# Patient Record
Sex: MALE | Race: WHITE | URBAN - METROPOLITAN AREA
[De-identification: names, ages, dates, MRNs, and addresses within clinical notes are randomized per-mention and may not be internally consistent; named-entity substitution may affect disease eponyms.]

---

## 2018-01-05 ENCOUNTER — HOSPITAL ENCOUNTER (EMERGENCY)
Facility: CLINIC | Age: 73
Discharge: HOME | End: 2018-01-05
Attending: INTERNAL MEDICINE | Admitting: INTERNAL MEDICINE
Payer: MEDICARE

## 2018-01-05 ENCOUNTER — APPOINTMENT (OUTPATIENT)
Dept: CT IMAGING | Facility: CLINIC | Age: 73
End: 2018-01-05
Attending: INTERNAL MEDICINE
Payer: MEDICARE

## 2018-01-05 ENCOUNTER — APPOINTMENT (OUTPATIENT)
Dept: GENERAL RADIOLOGY | Facility: CLINIC | Age: 73
End: 2018-01-05
Attending: INTERNAL MEDICINE
Payer: MEDICARE

## 2018-01-05 VITALS
HEART RATE: 64 BPM | RESPIRATION RATE: 16 BRPM | TEMPERATURE: 98.3 F | WEIGHT: 283.44 LBS | SYSTOLIC BLOOD PRESSURE: 147 MMHG | DIASTOLIC BLOOD PRESSURE: 66 MMHG | OXYGEN SATURATION: 96 %

## 2018-01-05 DIAGNOSIS — R47.9 TRANSIENT SPEECH DISTURBANCE: ICD-10-CM

## 2018-01-05 LAB
ALBUMIN SERPL-MCNC: 3.3 G/DL (ref 3.4–5)
ALBUMIN UR-MCNC: 10 MG/DL
ALP SERPL-CCNC: 122 U/L (ref 40–150)
ALT SERPL W P-5'-P-CCNC: 17 U/L (ref 0–70)
ANION GAP SERPL CALCULATED.3IONS-SCNC: 8 MMOL/L (ref 3–14)
APPEARANCE UR: CLEAR
AST SERPL W P-5'-P-CCNC: 16 U/L (ref 0–45)
BASOPHILS # BLD AUTO: 0 10E9/L (ref 0–0.2)
BASOPHILS NFR BLD AUTO: 0.3 %
BILIRUB SERPL-MCNC: 0.5 MG/DL (ref 0.2–1.3)
BILIRUB UR QL STRIP: NEGATIVE
BUN SERPL-MCNC: 22 MG/DL (ref 7–30)
CALCIUM SERPL-MCNC: 8 MG/DL (ref 8.5–10.1)
CHLORIDE SERPL-SCNC: 111 MMOL/L (ref 94–109)
CO2 SERPL-SCNC: 25 MMOL/L (ref 20–32)
COLOR UR AUTO: YELLOW
CREAT BLD-MCNC: 1.3 MG/DL (ref 0.66–1.25)
CREAT SERPL-MCNC: 1.28 MG/DL (ref 0.66–1.25)
CRP SERPL-MCNC: <2.9 MG/L (ref 0–8)
DIFFERENTIAL METHOD BLD: ABNORMAL
EOSINOPHIL # BLD AUTO: 0.3 10E9/L (ref 0–0.7)
EOSINOPHIL NFR BLD AUTO: 5.1 %
ERYTHROCYTE [DISTWIDTH] IN BLOOD BY AUTOMATED COUNT: 13.8 % (ref 10–15)
ERYTHROCYTE [SEDIMENTATION RATE] IN BLOOD BY WESTERGREN METHOD: 8 MM/H (ref 0–20)
GFR SERPL CREATININE-BSD FRML MDRD: 54 ML/MIN/1.7M2
GFR SERPL CREATININE-BSD FRML MDRD: 55 ML/MIN/1.7M2
GLUCOSE SERPL-MCNC: 82 MG/DL (ref 70–99)
GLUCOSE UR STRIP-MCNC: NEGATIVE MG/DL
HCT VFR BLD AUTO: 47.7 % (ref 40–53)
HGB BLD-MCNC: 16.2 G/DL (ref 13.3–17.7)
HGB UR QL STRIP: NEGATIVE
IMM GRANULOCYTES # BLD: 0 10E9/L (ref 0–0.4)
IMM GRANULOCYTES NFR BLD: 0.1 %
INR PPP: 1.64 (ref 0.86–1.14)
KETONES UR STRIP-MCNC: NEGATIVE MG/DL
LEUKOCYTE ESTERASE UR QL STRIP: NEGATIVE
LYMPHOCYTES # BLD AUTO: 2.1 10E9/L (ref 0.8–5.3)
LYMPHOCYTES NFR BLD AUTO: 30.8 %
MCH RBC QN AUTO: 35.1 PG (ref 26.5–33)
MCHC RBC AUTO-ENTMCNC: 34 G/DL (ref 31.5–36.5)
MCV RBC AUTO: 104 FL (ref 78–100)
MONOCYTES # BLD AUTO: 0.7 10E9/L (ref 0–1.3)
MONOCYTES NFR BLD AUTO: 10.3 %
MUCOUS THREADS #/AREA URNS LPF: PRESENT /LPF
NEUTROPHILS # BLD AUTO: 3.6 10E9/L (ref 1.6–8.3)
NEUTROPHILS NFR BLD AUTO: 53.4 %
NITRATE UR QL: NEGATIVE
NRBC # BLD AUTO: 0 10*3/UL
NRBC BLD AUTO-RTO: 0 /100
PH UR STRIP: 5.5 PH (ref 5–7)
PLATELET # BLD AUTO: 194 10E9/L (ref 150–450)
POTASSIUM SERPL-SCNC: 3.9 MMOL/L (ref 3.4–5.3)
PROT SERPL-MCNC: 6.7 G/DL (ref 6.8–8.8)
RBC # BLD AUTO: 4.61 10E12/L (ref 4.4–5.9)
RBC #/AREA URNS AUTO: 1 /HPF (ref 0–2)
SODIUM SERPL-SCNC: 144 MMOL/L (ref 133–144)
SOURCE: ABNORMAL
SP GR UR STRIP: 1.02 (ref 1–1.03)
TROPONIN I SERPL-MCNC: <0.015 UG/L (ref 0–0.04)
TSH SERPL DL<=0.005 MIU/L-ACNC: 2.47 MU/L (ref 0.4–4)
UROBILINOGEN UR STRIP-MCNC: NORMAL MG/DL (ref 0–2)
WBC # BLD AUTO: 6.7 10E9/L (ref 4–11)
WBC #/AREA URNS AUTO: <1 /HPF (ref 0–2)

## 2018-01-05 PROCEDURE — 70450 CT HEAD/BRAIN W/O DYE: CPT

## 2018-01-05 PROCEDURE — 82565 ASSAY OF CREATININE: CPT

## 2018-01-05 PROCEDURE — 99285 EMERGENCY DEPT VISIT HI MDM: CPT | Mod: 25 | Performed by: INTERNAL MEDICINE

## 2018-01-05 PROCEDURE — 85025 COMPLETE CBC W/AUTO DIFF WBC: CPT | Performed by: INTERNAL MEDICINE

## 2018-01-05 PROCEDURE — 85610 PROTHROMBIN TIME: CPT | Performed by: INTERNAL MEDICINE

## 2018-01-05 PROCEDURE — 81001 URINALYSIS AUTO W/SCOPE: CPT | Performed by: INTERNAL MEDICINE

## 2018-01-05 PROCEDURE — 93005 ELECTROCARDIOGRAM TRACING: CPT | Performed by: INTERNAL MEDICINE

## 2018-01-05 PROCEDURE — 80053 COMPREHEN METABOLIC PANEL: CPT | Performed by: INTERNAL MEDICINE

## 2018-01-05 PROCEDURE — 71045 X-RAY EXAM CHEST 1 VIEW: CPT

## 2018-01-05 PROCEDURE — 93010 ELECTROCARDIOGRAM REPORT: CPT | Mod: Z6 | Performed by: INTERNAL MEDICINE

## 2018-01-05 PROCEDURE — 84484 ASSAY OF TROPONIN QUANT: CPT | Performed by: INTERNAL MEDICINE

## 2018-01-05 PROCEDURE — 86140 C-REACTIVE PROTEIN: CPT | Performed by: INTERNAL MEDICINE

## 2018-01-05 PROCEDURE — 85652 RBC SED RATE AUTOMATED: CPT | Mod: 91 | Performed by: INTERNAL MEDICINE

## 2018-01-05 PROCEDURE — 84443 ASSAY THYROID STIM HORMONE: CPT | Performed by: INTERNAL MEDICINE

## 2018-01-05 RX ORDER — LORAZEPAM 1 MG/1
1 TABLET ORAL ONCE
Status: DISCONTINUED | OUTPATIENT
Start: 2018-01-05 | End: 2018-01-06 | Stop reason: HOSPADM

## 2018-01-05 ASSESSMENT — ENCOUNTER SYMPTOMS
DIFFICULTY URINATING: 0
SPEECH DIFFICULTY: 1
ABDOMINAL PAIN: 0
LIGHT-HEADEDNESS: 0
PALPITATIONS: 0
HEADACHES: 1
VOMITING: 0
CONFUSION: 0
COUGH: 0
NECK PAIN: 0
WEAKNESS: 0
CHILLS: 0
FEVER: 0
WHEEZING: 0
NAUSEA: 0
SHORTNESS OF BREATH: 0
NUMBNESS: 0
BACK PAIN: 0
ADENOPATHY: 0

## 2018-01-05 NOTE — ED AVS SNAPSHOT
[facility], Emergency Department    [address]    [facility] [address]    Phone:  [phone]    Fax:  [phone]                                       Bogdan Reyna   MRN: [id number]    Department:  [facility], Emergency Department   Date of Visit:  1/5/2018           Patient Information     Date Of Birth          1945        Your diagnoses for this visit were:     Transient speech disturbance        You were seen by Flako Fischer MD.      Follow-up Information     Follow up with [facility].    Specialty:  Clinic    Contact information:    [address] [id number] [phone]          Discharge Instructions       Please continue your current medications.  Further Evaluation is recommended with MRI.MRA of the brain and neck, Echocardiogram, and ambulatory heart rhythm monitor.  Please contact Dr Aragon at [facility] to arrange these studies.    24 Hour Appointment Hotline       To make an appointment at any [facility] clinic, call [phone] ([phone]). If you don't have a family doctor or clinic, we will help you find one. [facility] clinics are conveniently located to serve the needs of you and your family.             Review of your medicines      Notice     You have not been prescribed any medications.            Procedures and tests performed during your visit     Procedure/Test Number of Times Performed    CBC with platelets differential 1    CRP inflammation 1    Chest  XR, 1 view portable 1    Comprehensive metabolic panel 1    Creatinine POCT 1    EKG 12 lead 2    Erythrocyte sedimentation rate auto 1    Head CT w/o contrast 1    INR 1    ISTAT creatinine  POCT 1    TSH with free T4 reflex 1    Troponin I 1    UA with Microscopic reflex to Culture 1      Orders Needing Specimen Collection     None      Pending Results     Date and Time Order Name Status Description    1/5/2018 2051 Head CT w/o contrast Preliminary            "  Pending Culture Results     No orders found from 1/3/2018 to 2018.            Pending Results Instructions     If you had any lab results that were not finalized at the time of your Discharge, you can call the ED Lab Result RN at [phone]. You will be contacted by this team for any positive Lab results or changes in treatment. The nurses are available 7 days a week from 10A to 6:30P.  You can leave a message 24 hours per day and they will return your call.        Thank you for choosing [facility]       Thank you for choosing [facility] for your care. Our goal is always to provide you with excellent care. Hearing back from our patients is one way we can continue to improve our services. Please take a few minutes to complete the written survey that you may receive in the mail after you visit with us. Thank you!        [employer] Information     [employer] lets you send messages to your doctor, view your test results, renew your prescriptions, schedule appointments and more. To sign up, go to [email or web address] . Click on \"Log in\" on the left side of the screen, which will take you to the Welcome page. Then click on \"Sign up Now\" on the right side of the page.     You will be asked to enter the access code listed below, as well as some personal information. Please follow the directions to create your username and password.     Your access code is: [email or web address]  Expires: 2018 10:03 PM     Your access code will  in 90 days. If you need help or a new code, please call your [facility] clinic or [phone].        Care EveryWhere ID     This is your Care EveryWhere ID. This could be used by other organizations to access your [facility] medical records  [id number]        Equal Access to Services     MARCEL TIDWELL : antoni Ramsey, precious hawkins. So [facility] [phone].    ATENCIÓN: Si habla español, tiene a mccarty disposición " servicios gratuitos de asistencia lingüística. Haseeb kendrick [phone].    We comply with applicable federal civil rights laws and [place] laws. We do not discriminate on the basis of race, color, national origin, age, disability, sex, sexual orientation, or gender identity.            After Visit Summary       This is your record. Keep this with you and show to your community pharmacist(s) and doctor(s) at your next visit.

## 2018-01-05 NOTE — ED AVS SNAPSHOT
[facility], [place], Emergency Department    [address]    [facility] [address]    Phone:  [phone]    Fax:  [phone]                                       Bogdan Reyna   MRN: [id number]    Department:  [facility], Emergency Department   Date of Visit:  1/5/2018           After Visit Summary Signature Page     I have received my discharge instructions, and my questions have been answered. I have discussed any challenges I see with this plan with the nurse or doctor.    ..........................................................................................................................................  Patient/Patient Representative Signature      ..........................................................................................................................................  Patient Representative Print Name and Relationship to Patient    ..................................................               ................................................  Date                                            Time    ..........................................................................................................................................  Reviewed by Signature/Title    ...................................................              ..............................................  Date                                                            Time

## 2018-01-06 NOTE — DISCHARGE INSTRUCTIONS
Please continue your current medications.  Further Evaluation is recommended with MRI.MRA of the brain and neck, Echocardiogram, and ambulatory heart rhythm monitor.  [facility].  Return immediately for any new symptoms.

## 2018-01-06 NOTE — ED PROVIDER NOTES
"  History     Chief Complaint   Patient presents with     Slurred Speech     Onset last night at 6 pm episode of \"feeling hot almost sweating and I started slurring my speech which got better as I talked more,\" episode lasted 3 to 4 minutes, MD wanted follow up today.     BOBBY  Bogdan Reyna is a 72 year old male with a history of atrial fibrillation (Coumadin), TIA (2003),   carotid artery disease without cerebral infarction (s/p left internal carotid artery endarterectomy), coronary atherosclerosis of autologous vein bypass graft, HLD, HTN, and DM2 presents to the ED today after an episode of slurred speech last night.  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 All of a sudden, he felt hot and noted that he had slurred speech for about 3 minutes.  He denies any lightheadedness, palpitations, shortness of breath prior to the episode.  He does state he had some very mild left-sided chest discomfort and headache yesterday. Patient reports he talked with his primary care physician who advised him to come to the Emergency Department for further evaluation.  He otherwise currently denies any leg swelling, difficulty with writing or walking, difficulty with strength or coordination, or any metal in his body.    PAST MEDICAL HISTORY  Past Medical History:   Diagnosis Date     Diabetes (H)      Myocardial infarction    TIA  Type 2 DM  Paroxysmal atrial fibrillation.    PAST SURGICAL HISTORY  Past Surgical History:   Procedure Laterality Date     CARDIAC SURGERY       VASCULAR SURGERY     CABG 2005  Carotid endarterectomy 2003    FAMILY HISTORY  No family history on file.  SOCIAL HISTORY  Social History   Substance Use Topics     Smoking status: Not on file     Smokeless tobacco: Not on file     Alcohol use Not on file     MEDICATIONS  Losartan  Lipitor  Plaquenil  Coumadin  Lasix  Benadryl  Glipizide  Metoprolol  Zetia  Flomax  Metformin  Voltarin Gel    ALLERGIES  Not on File    I have " reviewed the Medications, Allergies, Past Medical and Surgical History, and Social History in the Epic system.    Review of Systems   Constitutional: Negative for chills and fever.   HENT: Negative for congestion.    Eyes: Negative for visual disturbance.   Respiratory: Negative for cough, shortness of breath and wheezing.    Cardiovascular: Positive for chest pain (mild chest discomfort ). Negative for palpitations and leg swelling.   Gastrointestinal: Negative for abdominal pain, nausea and vomiting.   Genitourinary: Negative for difficulty urinating.   Musculoskeletal: Negative for back pain and neck pain.   Skin: Negative for rash.   Neurological: Positive for speech difficulty (slurred speech) and headaches. Negative for syncope, weakness, light-headedness and numbness.   Hematological: Negative for adenopathy.   Psychiatric/Behavioral: Negative for confusion.   All other systems reviewed and are negative.      Physical Exam   BP: 150/60  Pulse: 64  Heart Rate: 64  Temp: 96  F (35.6  C)  Resp: 16  Weight: 128.6 kg (283 lb 7 oz)  SpO2: 97 %      Physical Exam   Constitutional: He is oriented to person, place, and time. He appears well-developed and well-nourished. No distress.   HENT:   Head: Normocephalic and atraumatic.   Right Ear: External ear normal.   Left Ear: External ear normal.   Nose: Nose normal.   Mouth/Throat: Oropharynx is clear and moist. No oropharyngeal exudate.   Eyes: EOM are normal. Pupils are equal, round, and reactive to light. No scleral icterus.   Neck: Normal range of motion. Neck supple. No JVD present. Carotid bruit is not present.       Cardiovascular: Normal rate, regular rhythm and normal heart sounds.  Exam reveals no friction rub.    No murmur heard.  Pulmonary/Chest: Effort normal and breath sounds normal. He has no wheezes. He has no rales.   Abdominal: Soft. Bowel sounds are normal. There is no tenderness. There is no rebound and no guarding.   Musculoskeletal: He exhibits no  edema or tenderness.   Lymphadenopathy:     He has no cervical adenopathy.   Neurological: He is alert and oriented to person, place, and time. He has normal strength. He displays normal reflexes. No cranial nerve deficit or sensory deficit. Coordination normal. GCS eye subscore is 4. GCS verbal subscore is 5. GCS motor subscore is 6.   Reflex Scores:       Bicep reflexes are 2+ on the right side and 2+ on the left side.       Brachioradialis reflexes are 2+ on the right side and 2+ on the left side.       Patellar reflexes are 2+ on the right side and 2+ on the left side.       Achilles reflexes are 2+ on the right side and 2+ on the left side.  Skin: Skin is warm and dry.   Psychiatric: He has a normal mood and affect. His behavior is normal.   Nursing note and vitals reviewed.      ED Course     ED Course     Procedures   6:05 PM  The patient was seen and examined by Dr. Solorio in Room 19.                EKG Interpretation:      Interpreted by FELIPE SOLORIO  Time reviewed: 1809  Symptoms at time of EKG: None   Rhythm: normal sinus   Rate: Normal  Axis: Normal  Ectopy: none  Conduction: normal  ST Segments/ T Waves: No ST-T wave changes and No acute ischemic changes  Q Waves: III  Comparison to prior: Prior atrial fibrillation resolved. Otherwise no acute change.    Clinical Impression: no acute changes    Labs/Imaging    Results for orders placed or performed during the hospital encounter of 01/05/18 (from the past 24 hour(s))   CBC with platelets differential   Result Value Ref Range    WBC 6.7 4.0 - 11.0 10e9/L    RBC Count 4.61 4.4 - 5.9 10e12/L    Hemoglobin 16.2 13.3 - 17.7 g/dL    Hematocrit 47.7 40.0 - 53.0 %     (H) 78 - 100 fl    MCH 35.1 (H) 26.5 - 33.0 pg    MCHC 34.0 31.5 - 36.5 g/dL    RDW 13.8 10.0 - 15.0 %    Platelet Count 194 150 - 450 10e9/L    Diff Method Automated Method     % Neutrophils 53.4 %    % Lymphocytes 30.8 %    % Monocytes 10.3 %    % Eosinophils 5.1 %    % Basophils  0.3 %    % Immature Granulocytes 0.1 %    Nucleated RBCs 0 0 /100    Absolute Neutrophil 3.6 1.6 - 8.3 10e9/L    Absolute Lymphocytes 2.1 0.8 - 5.3 10e9/L    Absolute Monocytes 0.7 0.0 - 1.3 10e9/L    Absolute Eosinophils 0.3 0.0 - 0.7 10e9/L    Absolute Basophils 0.0 0.0 - 0.2 10e9/L    Abs Immature Granulocytes 0.0 0 - 0.4 10e9/L    Absolute Nucleated RBC 0.0    Comprehensive metabolic panel   Result Value Ref Range    Sodium 144 133 - 144 mmol/L    Potassium 3.9 3.4 - 5.3 mmol/L    Chloride 111 (H) 94 - 109 mmol/L    Carbon Dioxide 25 20 - 32 mmol/L    Anion Gap 8 3 - 14 mmol/L    Glucose 82 70 - 99 mg/dL    Urea Nitrogen 22 7 - 30 mg/dL    Creatinine 1.28 (H) 0.66 - 1.25 mg/dL    GFR Estimate 55 (L) >60 mL/min/1.7m2    GFR Estimate If Black 67 >60 mL/min/1.7m2    Calcium 8.0 (L) 8.5 - 10.1 mg/dL    Bilirubin Total 0.5 0.2 - 1.3 mg/dL    Albumin 3.3 (L) 3.4 - 5.0 g/dL    Protein Total 6.7 (L) 6.8 - 8.8 g/dL    Alkaline Phosphatase 122 40 - 150 U/L    ALT 17 0 - 70 U/L    AST 16 0 - 45 U/L   CRP inflammation   Result Value Ref Range    CRP Inflammation <2.9 0.0 - 8.0 mg/L   INR   Result Value Ref Range    INR 1.64 (H) 0.86 - 1.14   Erythrocyte sedimentation rate auto   Result Value Ref Range    Sed Rate 8 0 - 20 mm/h   Troponin I   Result Value Ref Range    Troponin I ES <0.015 0.000 - 0.045 ug/L   TSH with free T4 reflex   Result Value Ref Range    TSH 2.47 0.40 - 4.00 mU/L   Creatinine POCT   Result Value Ref Range    Creatinine 1.3 (H) 0.66 - 1.25 mg/dL    GFR Estimate 54 (L) >60 mL/min/1.7m2    GFR Estimate If Black 66 >60 mL/min/1.7m2   Chest  XR, 1 view portable    Narrative    PORTABLE CHEST ONE VIEW   1/5/2018  6:51 PM    HISTORY: Mental status changes.    COMPARISON: 10/24/2005.      Impression    IMPRESSION: Again seen are surgical changes of a median sternotomy.  The lungs are clear with resolution of previously seen opacification  of the left lung base. No other change or abnormality is seen.      RICARDO GIL MD   UA with Microscopic reflex to Culture   Result Value Ref Range    Color Urine Yellow     Appearance Urine Clear     Glucose Urine Negative NEG^Negative mg/dL    Bilirubin Urine Negative NEG^Negative    Ketones Urine Negative NEG^Negative mg/dL    Specific Gravity Urine 1.020 1.003 - 1.035    Blood Urine Negative NEG^Negative    pH Urine 5.5 5.0 - 7.0 pH    Protein Albumin Urine 10 (A) NEG^Negative mg/dL    Urobilinogen mg/dL Normal 0.0 - 2.0 mg/dL    Nitrite Urine Negative NEG^Negative    Leukocyte Esterase Urine Negative NEG^Negative    Source Midstream Urine     WBC Urine <1 0 - 2 /HPF    RBC Urine 1 0 - 2 /HPF    Mucous Urine Present (A) NEG^Negative /LPF   Head CT w/o contrast    Narrative    CT OF THE HEAD WITHOUT CONTRAST 1/5/2018 9:48 PM     COMPARISON: None.    HISTORY: TIA-like episode, left temporal headache on coumadin.    TECHNIQUE: 5 mm thick axial CT images of the head were acquired  without IV contrast material.    FINDINGS: There is mild diffuse cerebral volume loss. There are subtle  patchy areas of decreased density in the cerebral white matter  bilaterally that are consistent with sequela of chronic small vessel  ischemic disease.    The ventricles and basal cisterns are within normal limits in  configuration given the degree of cerebral volume loss. There is no  midline shift. There are no extra-axial fluid collections.    No intracranial hemorrhage, mass or recent infarct.    The visualized paranasal sinuses are well-aerated. There is no  mastoiditis. There are no fractures of the visualized bones.      Impression    IMPRESSION: Diffuse cerebral volume loss and cerebral white matter  changes consistent with chronic small vessel ischemic disease. No  evidence for acute intracranial pathology.        Radiation dose for this scan was reduced using automated exposure  control, adjustment of the mA and/or kV according to patient size, or  iterative reconstruction  technique       Attempted to get MRI/MRA of head and neck. Patient refused to have the study. He was taken down to MRI to look at the machine. He was offered premedication with ativan. He did agree to head CT. At 24 hours post event this shows no hemorrhage and no evidence of subacute stroke.    Assessments & Plan (with Medical Decision Making)   Impression:  Middle aged male presents with a transient episode of flushing and slurring of his speech which occurred yesterday while on a business trip to [place]. Symptoms lasted for 3-4 minutes and resolved. He drove himself home from [place].. Currently he has no symptoms and has normal neurologic exam. Labs are unremarkable. Differential for the event includes TIA, hypoglycemia (his symptoms improved after eating dinner), paroxysmal atrial fibrillation with either RVR and hypoperfusion or embolic event, other transient metabolic event. He declined to have MRI after being brought down to see the machine. Currently he is in NSR. Labs are unremarkable. He had recent carotid US last month at [facility]. He did agree to head CT which showed diffuse volume loss and white matter changes consistent with small vessel ischemic changes. There was no hemorrhage. He should have complete evaluation with MRI/MRA, ambulatory heart rhythm monitoring and echocardiogram. He is already on coumadin. I will defer any decision regarding double coumadin and antiplatelet therapy to his primary providers. He was offered observation admit at the [facility], but was hesitant to accept this and strongly preferred outpatient workup. He lives with his wife and was instructed to return at any time for recurrent symptoms. I have also asked him to check his blood sugar if he has another event.    I have reviewed the nursing notes.    I have reviewed the findings, diagnosis, plan and need for follow up with the patient.    There are no discharge medications for this patient.      Final diagnoses:    Transient speech disturbance     I, John Kurtz, am serving as a trained medical scribe to document services personally performed by Flako Fischer MD, based on the provider's statements to me.      I, Flako Fischer MD, was physically present and have reviewed and verified the accuracy of this note documented by John Kurtz.     1/5/2018   [facility], [place], EMERGENCY DEPARTMENT     Flako Fischer MD  01/05/18 4684

## 2018-01-08 LAB — INTERPRETATION ECG - MUSE: NORMAL

## 2018-05-01 ENCOUNTER — HOSPITAL ENCOUNTER (EMERGENCY)
Facility: CLINIC | Age: 73
Discharge: HOME | End: 2018-05-01
Attending: EMERGENCY MEDICINE | Admitting: EMERGENCY MEDICINE
Payer: MEDICARE

## 2018-05-01 ENCOUNTER — APPOINTMENT (OUTPATIENT)
Dept: GENERAL RADIOLOGY | Facility: CLINIC | Age: 73
End: 2018-05-01
Attending: EMERGENCY MEDICINE
Payer: MEDICARE

## 2018-05-01 ENCOUNTER — APPOINTMENT (OUTPATIENT)
Dept: CT IMAGING | Facility: CLINIC | Age: 73
End: 2018-05-01
Attending: EMERGENCY MEDICINE
Payer: MEDICARE

## 2018-05-01 VITALS
RESPIRATION RATE: 18 BRPM | OXYGEN SATURATION: 97 % | TEMPERATURE: 97.5 F | DIASTOLIC BLOOD PRESSURE: 78 MMHG | SYSTOLIC BLOOD PRESSURE: 163 MMHG | WEIGHT: 284.6 LBS | HEART RATE: 68 BPM

## 2018-05-01 DIAGNOSIS — S60.229A CONTUSION OF HAND, UNSPECIFIED LATERALITY, INITIAL ENCOUNTER: ICD-10-CM

## 2018-05-01 DIAGNOSIS — S00.81XA ABRASION OF FACE, INITIAL ENCOUNTER: ICD-10-CM
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
## 2018-05-01 LAB — INR PPP: 2.72 (ref 0.86–1.14)

## 2018-05-01 PROCEDURE — 73110 X-RAY EXAM OF WRIST: CPT | Mod: RT

## 2018-05-01 PROCEDURE — 99284 EMERGENCY DEPT VISIT MOD MDM: CPT | Performed by: EMERGENCY MEDICINE

## 2018-05-01 PROCEDURE — 85610 PROTHROMBIN TIME: CPT | Performed by: EMERGENCY MEDICINE

## 2018-05-01 PROCEDURE — 99284 EMERGENCY DEPT VISIT MOD MDM: CPT | Mod: Z6 | Performed by: EMERGENCY MEDICINE

## 2018-05-01 PROCEDURE — 70450 CT HEAD/BRAIN W/O DYE: CPT

## 2018-05-01 RX ORDER — EZETIMIBE 10 MG/1
TABLET ORAL
COMMUNITY
Start: 2017-11-14

## 2018-05-01 RX ORDER — TAMSULOSIN HYDROCHLORIDE 0.4 MG/1
CAPSULE ORAL
COMMUNITY
Start: 2017-11-14

## 2018-05-01 RX ORDER — GLIPIZIDE 5 MG/1
5 TABLET, FILM COATED, EXTENDED RELEASE ORAL
COMMUNITY
Start: 2017-08-25 | End: 2018-08-25

## 2018-05-01 RX ORDER — LOSARTAN POTASSIUM 50 MG/1
50 TABLET ORAL
COMMUNITY
Start: 2017-02-07

## 2018-05-01 RX ORDER — FUROSEMIDE 20 MG
TABLET ORAL
COMMUNITY
Start: 2017-05-17

## 2018-05-01 RX ORDER — CLOTRIMAZOLE AND BETAMETHASONE DIPROPIONATE 10; .64 MG/G; MG/G
CREAM TOPICAL
COMMUNITY
Start: 2017-07-21

## 2018-05-01 RX ORDER — METFORMIN HCL 500 MG
TABLET, EXTENDED RELEASE 24 HR ORAL
COMMUNITY
Start: 2017-11-27

## 2018-05-01 RX ORDER — ATORVASTATIN CALCIUM 80 MG/1
80 TABLET, FILM COATED ORAL
COMMUNITY
Start: 2018-01-08 | End: 2019-01-08

## 2018-05-01 RX ORDER — CALCIUM CARBONATE/VITAMIN D3 600 MG-10
TABLET ORAL
COMMUNITY
Start: 2017-05-25

## 2018-05-01 RX ORDER — WARFARIN SODIUM 2 MG/1
TABLET ORAL
COMMUNITY
Start: 2018-01-15

## 2018-05-01 RX ORDER — METOPROLOL TARTRATE 50 MG
75 TABLET ORAL
COMMUNITY
Start: 2017-10-06 | End: 2018-10-06

## 2018-05-01 RX ORDER — HYDROXYCHLOROQUINE SULFATE 200 MG/1
TABLET, FILM COATED ORAL
COMMUNITY
Start: 2018-03-16

## 2018-05-01 ASSESSMENT — ENCOUNTER SYMPTOMS
VOMITING: 0
NUMBNESS: 0
NAUSEA: 0
WOUND: 1
WEAKNESS: 0
NECK PAIN: 0

## 2018-05-01 NOTE — ED AVS SNAPSHOT
[facility], Emergency Department    [address]    [place] [address]    Phone:  [phone]    Fax:  [phone]                                       Bogdan Reyna   MRN: [id number]    Department:  [facility], Emergency Department   Date of Visit:  5/1/2018           Patient Information     Date Of Birth          1945        Your diagnoses for this visit were:     Contusion of hand, unspecified laterality, initial encounter     Fall, initial encounter     Abrasion of face, initial encounter        You were seen by Ramón Barrera MD.        Discharge Instructions       Please make an appointment to follow up with Your Primary Care Provider as needed.    Ice to areas of pain.    Return to the emergency department for headaches, nausea or vomiting, or any other problems.      24 Hour Appointment Hotline       To make an appointment at any [facility] clinic, call [phone] ([phone]). If you don't have a family doctor or clinic, we will help you find one. [facility] clinics are conveniently located to serve the needs of you and your family.             Review of your medicines      Our records show that you are taking the medicines listed below. If these are incorrect, please call your family doctor or clinic.        Dose / Directions Last dose taken    atorvastatin 80 MG tablet   Commonly known as:  LIPITOR   Dose:  80 mg        Take 80 mg by mouth   Refills:  0        clotrimazole-betamethasone cream   Commonly known as:  LOTRISONE        Refills:  0        cyanocobalamin 250 MCG tablet   Commonly known as:  vitamin  B-12        TAKE TWO TABLETS BY MOUTH EVERY DAY   Refills:  0        diclofenac 1 % Gel topical gel   Commonly known as:  VOLTAREN        On left knee twice per day   Refills:  0        ezetimibe 10 MG tablet   Commonly known as:  ZETIA        TAKE ONE TABLET BY MOUTH EVERY DAY   Refills:  0        furosemide 20 MG tablet   Commonly known as:  LASIX         Take 2 tablets (40 mg) in the morning and 1 tablet (20 mg) in the afternoon   Refills:  0        glipiZIDE 5 MG 24 hr tablet   Commonly known as:  GLUCOTROL XL   Dose:  5 mg        Take 5 mg by mouth   Refills:  0        hydroxychloroquine 200 MG tablet   Commonly known as:  PLAQUENIL        TAKE 1 TABLET BY MOUTH TWO TIMES A DAY.   Refills:  0        losartan 50 MG tablet   Commonly known as:  COZAAR   Dose:  50 mg        Take 50 mg by mouth   Refills:  0        metFORMIN 500 MG 24 hr tablet   Commonly known as:  GLUCOPHAGE-XR        TAKE ONE TABLET BY MOUTH EVERY DAY WITH BREAKFAST   Refills:  0        metoprolol tartrate 50 MG tablet   Commonly known as:  LOPRESSOR   Dose:  75 mg        Take 75 mg by mouth   Refills:  0        ranitidine 150 MG tablet   Commonly known as:  ZANTAC   Dose:  150 mg        Take 150 mg by mouth   Refills:  0        tamsulosin 0.4 MG capsule   Commonly known as:  FLOMAX        TAKE 2 CAPSULES BY MOUTH DAILY.   Refills:  0        warfarin 2 MG tablet   Commonly known as:  COUMADIN        Take 4 mg (2 mg x 2) on Mon, Wed, Fri; 2 mg (2 mg x 1) all other days. Adjust as directed.   Refills:  0                Procedures and tests performed during your visit     Head CT w/o contrast    INR    XR Wrist Left G/E 3 Views    XR Wrist Right G/E 3 Views      Orders Needing Specimen Collection     None      Pending Results     No orders found from 4/29/2018 to 5/2/2018.            Pending Culture Results     No orders found from 4/29/2018 to 5/2/2018.            Pending Results Instructions     If you had any lab results that were not finalized at the time of your Discharge, you can call the ED Lab Result RN at [phone]. You will be contacted by this team for any positive Lab results or changes in treatment. The nurses are available 7 days a week from 10A to 6:30P.  You can leave a message 24 hours per day and they will return your call.        Thank you for choosing [facility]       Thank you  "for choosing [facility] for your care. Our goal is always to provide you with excellent care. Hearing back from our patients is one way we can continue to improve our services. Please take a few minutes to complete the written survey that you may receive in the mail after you visit with us. Thank you!        [employer] Information     [employer] lets you send messages to your doctor, view your test results, renew your prescriptions, schedule appointments and more. To sign up, go to [email or web address] . Click on \"Log in\" on the left side of the screen, which will take you to the Welcome page. Then click on \"Sign up Now\" on the right side of the page.     You will be asked to enter the access code listed below, as well as some personal information. Please follow the directions to create your username and password.     Your access code is: [email or web address]  Expires: 2018 10:48 PM     Your access code will  in 90 days. If you need help or a new code, please call your [facility] clinic or [phone].        Care EveryWhere ID     This is your Care EveryWhere ID. This could be used by other organizations to access your [facility] medical records  [id number]        Equal Access to Services     MARCEL TIDWELL : Joby Dunbar, antoni rowland, mikal aleman, precious jaimes. So [facility] [phone].    ATENCIÓN: Si habla español, tiene a mccarty disposición servicios gratuitos de asistencia lingüística. Llame al [phone].    We comply with applicable federal civil rights laws and [place] laws. We do not discriminate on the basis of race, color, national origin, age, disability, sex, sexual orientation, or gender identity.            After Visit Summary       This is your record. Keep this with you and show to your community pharmacist(s) and doctor(s) at your next visit.                  "

## 2018-05-01 NOTE — ED AVS SNAPSHOT
[facility], [place], Emergency Department    [address]    [facility] [address]    Phone:  [phone]    Fax:  [phone]                                       Bogdan Reyna   MRN: [id number]    Department:  [facility], Emergency Department   Date of Visit:  5/1/2018           After Visit Summary Signature Page     I have received my discharge instructions, and my questions have been answered. I have discussed any challenges I see with this plan with the nurse or doctor.    ..........................................................................................................................................  Patient/Patient Representative Signature      ..........................................................................................................................................  Patient Representative Print Name and Relationship to Patient    ..................................................               ................................................  Date                                            Time    ..........................................................................................................................................  Reviewed by Signature/Title    ...................................................              ..............................................  Date                                                            Time

## 2018-05-02 NOTE — ED PROVIDER NOTES
History   No chief complaint on file.    BOBBY Reyna is a 72 year old male with history of atrial fibrillation (on Coumadin), CAD( triple coronary artery bypass graft)  DM2, HTN, and HLD who presents to the ED after a ground level mechanical fall. He reports he started to fall because his leg buckled.  He was able to break his fall by landing on his knees and hands.  He reports that his glasses did come up and abrade the right side of his face. He did bump his head but states that he did not lose consciousness. He also states that he did not hit his head hard at all.  He denies nausea, vomiting, change in vision, numbness or weakness of arms or legs, neck pain, or gait issues and has no other specific complaints.     This part of the medical record was transcribed by Mehul Walters Scribbonny, from a dictation done by Ramón Barrera MD.     PAST MEDICAL HISTORY  Past Medical History:   Diagnosis Date     Diabetes (H)      Myocardial infarction      PAST SURGICAL HISTORY  Past Surgical History:   Procedure Laterality Date     CARDIAC SURGERY      triple bypass     VASCULAR SURGERY      carotid arterectomy     FAMILY HISTORY  No family history on file.  SOCIAL HISTORY  Social History   Substance Use Topics     Smoking status: Former Smoker     Smokeless tobacco: Never Used      Comment: Quit 45 years ago     Alcohol use Yes      Comment: socially     MEDICATIONS  No current facility-administered medications for this encounter.      Current Outpatient Prescriptions   Medication     atorvastatin (LIPITOR) 80 MG tablet     clotrimazole-betamethasone (LOTRISONE) cream     cyanocobalamin (VITAMIN  B-12) 250 MCG tablet     diclofenac (VOLTAREN) 1 % GEL topical gel     ezetimibe (ZETIA) 10 MG tablet     furosemide (LASIX) 20 MG tablet     glipiZIDE (GLUCOTROL XL) 5 MG 24 hr tablet     hydroxychloroquine (PLAQUENIL) 200 MG tablet     losartan (COZAAR) 50 MG tablet     metFORMIN (GLUCOPHAGE-XR) 500 MG 24 hr  tablet     metoprolol tartrate (LOPRESSOR) 50 MG tablet     ranitidine (ZANTAC) 150 MG tablet     tamsulosin (FLOMAX) 0.4 MG capsule     warfarin (COUMADIN) 2 MG tablet     ALLERGIES  Not on File      I have reviewed the Medications, Allergies, Past Medical and Surgical History, and Social History in the Epic system.    Review of Systems   Eyes: Negative for visual disturbance.   Gastrointestinal: Negative for nausea and vomiting.   Musculoskeletal: Negative for gait problem and neck pain.   Skin: Positive for wound (contusions on thenar eminence of both hands).   Neurological: Negative for weakness and numbness.   All other systems reviewed and are negative.      Physical Exam   BP: 158/59  Pulse: 68  Heart Rate: 64  Temp: 97.5  F (36.4  C)  Resp: 18  Weight: 129.1 kg (284 lb 9.6 oz)  SpO2: 94 %      Physical Exam   Constitutional: He is oriented to person, place, and time. Vital signs are normal. He appears well-developed and well-nourished.  Non-toxic appearance. He does not appear ill. No distress.   HENT:   Head: Normocephalic. Head is with abrasion.       Mouth/Throat: Oropharynx is clear and moist. No oropharyngeal exudate.   Eyes: Conjunctivae and EOM are normal. Pupils are equal, round, and reactive to light. No scleral icterus.   Neck: Normal range of motion. Neck supple. No JVD present. No tracheal deviation present. No thyromegaly present.   Cardiovascular: Normal rate, regular rhythm, normal heart sounds and intact distal pulses.  Exam reveals no gallop and no friction rub.    No murmur heard.  Pulmonary/Chest: Effort normal and breath sounds normal. No respiratory distress. He exhibits no tenderness.   Abdominal: Soft. Bowel sounds are normal. He exhibits no distension and no mass. There is no tenderness.   Musculoskeletal: Normal range of motion. He exhibits no edema.        Cervical back: He exhibits no tenderness.        Thoracic back: He exhibits no tenderness.        Lumbar back: He exhibits no  tenderness.        Right hand: He exhibits tenderness and swelling. He exhibits normal range of motion, normal capillary refill, no deformity and no laceration. Normal sensation noted.        Left hand: He exhibits tenderness and swelling. He exhibits normal range of motion, normal capillary refill and no deformity. Normal sensation noted. Normal strength noted.        Hands:  Lymphadenopathy:     He has no cervical adenopathy.   Neurological: He is alert and oriented to person, place, and time. He has normal strength. He is not disoriented. No cranial nerve deficit or sensory deficit. Coordination and gait normal. GCS eye subscore is 4. GCS verbal subscore is 5. GCS motor subscore is 6.   Skin: Skin is warm and dry. No rash noted. No erythema. No pallor.   Psychiatric: He has a normal mood and affect. His behavior is normal.   Nursing note and vitals reviewed.      ED Course     ED Course     Procedures        Results for orders placed or performed during the hospital encounter of 05/01/18   Head CT w/o contrast    Narrative    CT SCAN OF THE HEAD WITHOUT CONTRAST   5/1/2018 8:48 PM     HISTORY: Trauma.     TECHNIQUE:  Axial images of the head and coronal reformations without  IV contrast material. Radiation dose for this scan was reduced using  automated exposure control, adjustment of the mA and/or kV according  to patient size, or iterative reconstruction technique.    COMPARISON: Head CT 1/5/2018.    FINDINGS: There is no evidence of intracranial hemorrhage, mass, acute  infarct or anomaly. The ventricles are normal in size, shape and  configuration. Mild diffuse parenchymal volume loss. Mild patchy  periventricular white matter hypodensities which are nonspecific, but  likely related to chronic microvascular ischemic disease. Focal area  of cortical encephalomalacia in the right parietal lobe is unchanged.    The visualized portions of the sinuses and mastoids appear normal. The  bony calvarium and bones of  the skull base appear intact.       Impression    IMPRESSION:     1. No evidence of acute intracranial hemorrhage, mass, or herniation.  2. Mild diffuse parenchymal volume loss and white matter changes  likely due to chronic microvascular ischemic disease.   3. Focal area of cortical encephalomalacia in the right parietal lobe  is unchanged and possibly due to previous infarct.      TOBIN WARNER MD   XR Wrist Right G/E 3 Views    Narrative    RIGHT WRIST THREE OR MORE VIEWS   5/1/2018 9:22 PM     HISTORY: Trauma.    COMPARISON: None.       Impression    IMPRESSION: No acute fracture or dislocation. Arthritis in the wrist  and at the thumb carpometacarpal joint.    JOSHUA AGUILLON MD   XR Wrist Left G/E 3 Views    Narrative    WRIST LEFT THREE OR MORE VIEWS   5/1/2018 9:24 PM     HISTORY: Trauma.     COMPARISON: None.       Impression    IMPRESSION: No acute fracture or dislocation. Arthritis at the thumb  carpometacarpal joint.    JOSHUA AGUILLON MD   INR   Result Value Ref Range    INR 2.72 (H) 0.86 - 1.14            Assessments & Plan (with Medical Decision Making)   This patient presented to the Emergency Department for fall.  He did have bruising on both thenar eminences of his hands and had some wrist pain to palpation but no evidence of fracture on x-ray.  CT of the head was obtained and is negative for bleed.  INR is 2.7, and the patient's head injury was quite mild. He does not wish to stay in the Emergency Department for repeat CT.  He is requesting to go home, and at this point,  given a his mechanism, I feel comfortable discharging him.  He was told to return should he develop a headache, nausea, vomiting, or any other symptoms.  He was discharged home in good condition.    This part of the medical record was transcribed by John Kurtz Medical Scribe, from a dictation done by Ramón Barrera MD.     I have reviewed the nursing notes.    I have reviewed the findings, diagnosis, plan and need for follow  up with the patient.    Discharge Medication List as of 5/1/2018 10:53 PM          Final diagnoses:   Contusion of hand, unspecified laterality, initial encounter   Fall, initial encounter   Abrasion of face, initial encounter       5/1/2018   [facility], [place], EMERGENCY DEPARTMENT     Ramón Barrera MD  05/03/18 0837

## 2018-05-02 NOTE — DISCHARGE INSTRUCTIONS
Please make an appointment to follow up with Your Primary Care Provider as needed.    Ice to areas of pain.    Return to the emergency department for headaches, nausea or vomiting, or any other problems.

## 2018-05-02 NOTE — ED NOTES
Gave pt education forms for warfarin. Pt needs education regarding medication and food guidelines, risks, warning signs for bleeding regarding coumadin. Educated generally regarding topic, informed pt to seek more education from PCP and coumadin clinic.

## 2018-05-02 NOTE — ED TRIAGE NOTES
Tripped over curb around 1330,  pt.  landed on hands and knees.  Pt.  c/o right  knee pain and bilateral hand pain.  Pt.  also hit head.  Has small scratch under right eye.  Pt. denies H/A or blurry vision.  Was told to come to ED by primary MD due to being on coumadin.  Pt. states Head doesn't even hurt.